# Patient Record
(demographics unavailable — no encounter records)

---

## 2025-01-21 NOTE — ASSESSMENT
[FreeTextEntry1] : Annual SBIRT negative Depression screen negative 5 mins spent on screening Check comprehensive labs, in office today  Vaccines  Adacel declines Pneumovax declines Shingrix declines Flu vaccine administered COVID booster declined EKG NSR no acute changes  GYN  pap declines, advised to go. Mammogram advised again to schedule. Has Rx from December Colonoscopy to schedule Rx fecal occult  prediabetes declines medications low carbohydrate diet.  Patient is not keen on medication. Check A1c today.  HLD: Has lost weight. Low fat diet and regular exercise. resolved last labs.  Depression: on Sertraline, states she is compliant. Also, on Aripiprazole did well on the medication. 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 Villavicencio at [facility] Called to get copy of consult  Glaucoma regular follow-up with ophthalmologist  she states she is compliant with the eyedrops.  Follow-up in 6 months no,

## 2025-01-21 NOTE — REVIEW OF SYSTEMS
No [Negative] : Heme/Lymph [Fever] : no fever [Fatigue] : no fatigue [Hot Flashes] : no hot flashes [Night Sweats] : no night sweats [Recent Change In Weight] : ~T no recent weight change [Dysuria] : no dysuria [Incontinence] : no incontinence [Hematuria] : no hematuria [Frequency] : no frequency [Vaginal Discharge] : no vaginal discharge [de-identified] : see HPI

## 2025-01-21 NOTE — PHYSICAL EXAM
[Normal Sclera/Conjunctiva] : normal sclera/conjunctiva [No Edema] : there was no peripheral edema [Normal Gait] : normal gait [Normal] : affect was normal and insight and judgment were intact

## 2025-01-21 NOTE — HISTORY OF PRESENT ILLNESS
[FreeTextEntry1] : Annual [de-identified] : Ms. DIONICIO MAHMOOD is a 59-year-old female presenting for a follow up. Here with her son.  had active TB. QuantiFERON positive CXR negative On isoniazid for 9 months from infectious disease specialist Dr. Villavicencio at the [facility]. Monthly Prediabetes, last states diet is okay Anxiety and depression on sertraline and Aripiprazole. Requesting renewal. Glaucoma, seeing Ophthalmologist regularly. 2024: Doing well on omeprazole and sertraline but questioning if she should stay on the medication.  Son states the last time they took her off the medication she did poorly had to go up and had to go back on the medication. Diet is rich in carbs.  Patient cooks at home but adds sugar to her food in small amounts. She states his sister who is 65 years of age has diabetes. States she has some ear discharge in right ear. No pain. notices after taking a shower. Doing well, She is taking sertraline. Her mother just passed away in [place].  Unable to go states her passport .  She has stopped aripiprazole but she states she stopped it after for 5 days because she was feeling dizzy and tired in the morning. She also thinks the headaches are from the medication.  But she was complaining of headaches in August when she saw me prior to starting the medication. Working on diet changes to reduce A1C. Previous Hx: She denies headaches, when her son states she complains to him occasional  headaches. She states she sometimes feels stressed but does not have a headache No taking any supplements asking if she should take any. She has made changes to lifestyle because eof the elevated A1C. More active. Doing well on Sertraline. prediabetes  diet is okay  HLD: Has lost weight.  Depression: Doing well on Sertraline. weaned off Aripiprazole.

## 2025-01-21 NOTE — HEALTH RISK ASSESSMENT
[Very Good] : ~his/her~  mood as very good [No] : No [No falls in past year] : Patient reported no falls in the past year [Little interest or pleasure doing things] : 1) Little interest or pleasure doing things [Feeling down, depressed, or hopeless] : 2) Feeling down, depressed, or hopeless [0] : 2) Feeling down, depressed, or hopeless: Not at all (0) [PHQ-2 Negative - No further assessment needed] : PHQ-2 Negative - No further assessment needed [Never] : Never [NO] : No [Patient declined mammogram] : Patient declined mammogram [Patient declined PAP Smear] : Patient declined PAP Smear [Patient declined bone density test] : Patient declined bone density test [Patient declined colonoscopy] : Patient declined colonoscopy [HIV Test offered] : HIV Test offered [Behavioral] : behavioral [Cultural] : cultural [Transportation] : transportation [With Family] : lives with family [Unemployed] : unemployed [] :  [Smoke Detector] : smoke detector [Carbon Monoxide Detector] : carbon monoxide detector [Seat Belt] :  uses seat belt [TB Exposure] : is being exposed to tuberculosis [Reviewed updated] : Reviewed, updated [Name: ___] : Health Care Proxy's Name: [unfilled]  [Relationship: ___] : Relationship: [unfilled] [Audit-CScore] : 0 [[id number]] : 0 [Change in mental status noted] : No change in mental status noted [Reports changes in hearing] : Reports no changes in hearing [Reports changes in vision] : Reports no changes in vision [Reports changes in dental health] : Reports no changes in dental health [Sunscreen] : does not use sunscreen [HepatitisCDate] : 9/2019 [AdvancecareDate] : 1/2025